# Patient Record
Sex: MALE | Race: WHITE | NOT HISPANIC OR LATINO | Employment: PART TIME | URBAN - METROPOLITAN AREA
[De-identification: names, ages, dates, MRNs, and addresses within clinical notes are randomized per-mention and may not be internally consistent; named-entity substitution may affect disease eponyms.]

---

## 2019-07-11 ENCOUNTER — APPOINTMENT (EMERGENCY)
Dept: RADIOLOGY | Facility: HOSPITAL | Age: 39
End: 2019-07-11
Payer: COMMERCIAL

## 2019-07-11 ENCOUNTER — HOSPITAL ENCOUNTER (EMERGENCY)
Facility: HOSPITAL | Age: 39
Discharge: HOME | End: 2019-07-11
Attending: INTERNAL MEDICINE | Admitting: INTERNAL MEDICINE
Payer: COMMERCIAL

## 2019-07-11 VITALS
TEMPERATURE: 97.8 F | SYSTOLIC BLOOD PRESSURE: 141 MMHG | BODY MASS INDEX: 30.8 KG/M2 | HEART RATE: 81 BPM | HEIGHT: 74 IN | RESPIRATION RATE: 18 BRPM | OXYGEN SATURATION: 96 % | DIASTOLIC BLOOD PRESSURE: 81 MMHG | WEIGHT: 240 LBS
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
## 2019-07-11 DIAGNOSIS — M54.50 LUMBAGO: ICD-10-CM

## 2019-07-11 DIAGNOSIS — M77.9 TENDINITIS: ICD-10-CM

## 2019-07-11 PROCEDURE — 73080 X-RAY EXAM OF ELBOW: CPT

## 2019-07-11 PROCEDURE — 73060 X-RAY EXAM OF HUMERUS: CPT

## 2019-07-11 PROCEDURE — 72100 X-RAY EXAM L-S SPINE 2/3 VWS: CPT

## 2019-07-11 PROCEDURE — 96372 THER/PROPH/DIAG INJ SC/IM: CPT

## 2019-07-11 PROCEDURE — 99284 EMERGENCY DEPT VISIT MOD MDM: CPT

## 2019-07-11 RX ORDER — KETOROLAC TROMETHAMINE 30 MG/ML
30 INJECTION, SOLUTION INTRAMUSCULAR; INTRAVENOUS ONCE
Status: COMPLETED | OUTPATIENT
Start: 2019-07-11 | End: 2019-07-11

## 2019-07-11 RX ORDER — NAPROXEN 500 MG/1
500 TABLET ORAL 2 TIMES DAILY WITH MEALS
Qty: 30 TABLET | Refills: 0 | Status: SHIPPED | OUTPATIENT
Start: 2019-07-11 | End: 2021-01-10

## 2019-07-11 RX ORDER — CYCLOBENZAPRINE HCL 10 MG
10 TABLET ORAL 2 TIMES DAILY PRN
Qty: 20 TABLET | Refills: 0 | Status: SHIPPED | OUTPATIENT
Start: 2019-07-11 | End: 2020-05-08 | Stop reason: ALTCHOICE

## 2019-07-11 RX ORDER — CYCLOBENZAPRINE HCL 10 MG
10 TABLET ORAL ONCE
Status: COMPLETED | OUTPATIENT
Start: 2019-07-11 | End: 2019-07-11

## 2019-07-11 RX ADMIN — CYCLOBENZAPRINE HYDROCHLORIDE 10 MG: 10 TABLET, FILM COATED ORAL at 22:41

## 2019-07-11 RX ADMIN — KETOROLAC TROMETHAMINE 30 MG: 30 INJECTION, SOLUTION INTRAMUSCULAR; INTRAVENOUS at 22:42
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
## 2019-07-12 NOTE — DISCHARGE INSTRUCTIONS
Patient told to follow up with his PMD in 40-72 hours if not improving or to return to the emergency room if worsening

## 2019-07-12 NOTE — ED NOTES
Right elbow abrasion clensed with mamta clens, no bleeding noted       [address] RN  07/11/19 7006
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
## 2020-05-08 ENCOUNTER — OFFICE VISIT (OUTPATIENT)
Dept: URGENT CARE | Facility: CLINIC | Age: 40
End: 2020-05-08
Payer: COMMERCIAL

## 2020-05-08 VITALS
WEIGHT: 225 LBS | HEIGHT: 74 IN | SYSTOLIC BLOOD PRESSURE: 120 MMHG | TEMPERATURE: 97.5 F | OXYGEN SATURATION: 95 % | HEART RATE: 68 BPM | DIASTOLIC BLOOD PRESSURE: 63 MMHG | RESPIRATION RATE: 18 BRPM | BODY MASS INDEX: 28.88 KG/M2

## 2020-05-08 DIAGNOSIS — S39.012A LUMBAR STRAIN, INITIAL ENCOUNTER: Primary | ICD-10-CM

## 2020-05-08 DIAGNOSIS — M62.830 BACK MUSCLE SPASM: ICD-10-CM

## 2020-05-08 PROCEDURE — G0383 LEV 4 HOSP TYPE B ED VISIT: HCPCS | Performed by: FAMILY MEDICINE

## 2020-05-08 PROCEDURE — 96372 THER/PROPH/DIAG INJ SC/IM: CPT | Performed by: FAMILY MEDICINE

## 2020-05-08 PROCEDURE — 99204 OFFICE O/P NEW MOD 45 MIN: CPT | Performed by: FAMILY MEDICINE

## 2020-05-08 PROCEDURE — 99284 EMERGENCY DEPT VISIT MOD MDM: CPT | Performed by: FAMILY MEDICINE

## 2020-05-08 RX ORDER — TIZANIDINE HYDROCHLORIDE 4 MG/1
4 CAPSULE, GELATIN COATED ORAL 3 TIMES DAILY PRN
Qty: 30 CAPSULE | Refills: 0 | Status: SHIPPED | OUTPATIENT
Start: 2020-05-08 | End: 2021-01-10

## 2020-05-08 RX ORDER — MELOXICAM 7.5 MG/1
7.5 TABLET ORAL 2 TIMES DAILY PRN
Qty: 30 TABLET | Refills: 0 | Status: SHIPPED | OUTPATIENT
Start: 2020-05-08 | End: 2021-01-10

## 2020-05-08 RX ORDER — DEXAMETHASONE SODIUM PHOSPHATE 10 MG/ML
10 INJECTION, SOLUTION INTRAMUSCULAR; INTRAVENOUS ONCE
Status: COMPLETED | OUTPATIENT
Start: 2020-05-08 | End: 2020-05-08

## 2020-05-08 RX ADMIN — DEXAMETHASONE SODIUM PHOSPHATE 10 MG: 10 INJECTION, SOLUTION INTRAMUSCULAR; INTRAVENOUS at 16:52

## 2020-06-23 ENCOUNTER — OFFICE VISIT (OUTPATIENT)
Dept: URGENT CARE | Facility: CLINIC | Age: 40
End: 2020-06-23
Payer: COMMERCIAL

## 2020-06-23 ENCOUNTER — APPOINTMENT (OUTPATIENT)
Dept: RADIOLOGY | Facility: CLINIC | Age: 40
End: 2020-06-23
Payer: COMMERCIAL

## 2020-06-23 VITALS
BODY MASS INDEX: 28.23 KG/M2 | HEIGHT: 74 IN | OXYGEN SATURATION: 100 % | TEMPERATURE: 97.3 F | SYSTOLIC BLOOD PRESSURE: 116 MMHG | HEART RATE: 74 BPM | DIASTOLIC BLOOD PRESSURE: 74 MMHG | WEIGHT: 220 LBS | RESPIRATION RATE: 20 BRPM

## 2020-06-23 DIAGNOSIS — S60.511A ABRASION OF RIGHT HAND, INITIAL ENCOUNTER: ICD-10-CM

## 2020-06-23 DIAGNOSIS — S49.92XA INJURY OF LEFT SHOULDER, INITIAL ENCOUNTER: ICD-10-CM

## 2020-06-23 DIAGNOSIS — S40.219A ABRASION, SHOULDER W/O INFECTION: ICD-10-CM

## 2020-06-23 DIAGNOSIS — S43.102A SEPARATION OF LEFT ACROMIOCLAVICULAR JOINT, INITIAL ENCOUNTER: Primary | ICD-10-CM

## 2020-06-23 DIAGNOSIS — S49.92XA INJURY OF LEFT SHOULDER, INITIAL ENCOUNTER: Primary | ICD-10-CM

## 2020-06-23 PROCEDURE — 90715 TDAP VACCINE 7 YRS/> IM: CPT | Performed by: PHYSICIAN ASSISTANT

## 2020-06-23 PROCEDURE — G0382 LEV 3 HOSP TYPE B ED VISIT: HCPCS | Performed by: PHYSICIAN ASSISTANT

## 2020-06-23 PROCEDURE — 99213 OFFICE O/P EST LOW 20 MIN: CPT | Performed by: PHYSICIAN ASSISTANT

## 2020-06-23 PROCEDURE — 99283 EMERGENCY DEPT VISIT LOW MDM: CPT | Performed by: PHYSICIAN ASSISTANT

## 2020-06-23 PROCEDURE — 73030 X-RAY EXAM OF SHOULDER: CPT

## 2020-06-23 PROCEDURE — 90715 TDAP VACCINE 7 YRS/> IM: CPT

## 2020-06-25 VITALS — WEIGHT: 234 LBS | BODY MASS INDEX: 30.04 KG/M2

## 2020-06-25 DIAGNOSIS — S43.102A SEPARATION OF LEFT ACROMIOCLAVICULAR JOINT, INITIAL ENCOUNTER: ICD-10-CM

## 2020-06-25 PROCEDURE — 99203 OFFICE O/P NEW LOW 30 MIN: CPT | Performed by: ORTHOPAEDIC SURGERY

## 2021-01-10 ENCOUNTER — APPOINTMENT (EMERGENCY)
Dept: CT IMAGING | Facility: HOSPITAL | Age: 41
End: 2021-01-10
Payer: COMMERCIAL

## 2021-01-10 ENCOUNTER — HOSPITAL ENCOUNTER (EMERGENCY)
Facility: HOSPITAL | Age: 41
Discharge: HOME | End: 2021-01-10
Attending: EMERGENCY MEDICINE | Admitting: EMERGENCY MEDICINE
Payer: COMMERCIAL

## 2021-01-10 ENCOUNTER — APPOINTMENT (EMERGENCY)
Dept: RADIOLOGY | Facility: HOSPITAL | Age: 41
End: 2021-01-10
Payer: COMMERCIAL

## 2021-01-10 VITALS
SYSTOLIC BLOOD PRESSURE: 122 MMHG | TEMPERATURE: 98.6 F | OXYGEN SATURATION: 95 % | HEART RATE: 98 BPM | HEIGHT: 74 IN | RESPIRATION RATE: 18 BRPM | WEIGHT: 225 LBS | DIASTOLIC BLOOD PRESSURE: 65 MMHG | BODY MASS INDEX: 28.88 KG/M2
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
## 2021-01-10 DIAGNOSIS — S06.0X9A CONCUSSION: ICD-10-CM

## 2021-01-10 DIAGNOSIS — S22.32XA LEFT RIB FRACTURE: ICD-10-CM

## 2021-01-10 PROCEDURE — 99284 EMERGENCY DEPT VISIT MOD MDM: CPT | Performed by: PHYSICIAN ASSISTANT

## 2021-01-10 PROCEDURE — 71101 X-RAY EXAM UNILAT RIBS/CHEST: CPT

## 2021-01-10 PROCEDURE — 99284 EMERGENCY DEPT VISIT MOD MDM: CPT

## 2021-01-10 PROCEDURE — 73610 X-RAY EXAM OF ANKLE: CPT

## 2021-01-10 PROCEDURE — 96372 THER/PROPH/DIAG INJ SC/IM: CPT

## 2021-01-10 PROCEDURE — 70450 CT HEAD/BRAIN W/O DYE: CPT

## 2021-01-10 PROCEDURE — 73090 X-RAY EXAM OF FOREARM: CPT

## 2021-01-10 PROCEDURE — G1004 CDSM NDSC: HCPCS

## 2021-01-10 RX ORDER — KETOROLAC TROMETHAMINE 30 MG/ML
30 INJECTION, SOLUTION INTRAMUSCULAR; INTRAVENOUS ONCE
Status: COMPLETED | OUTPATIENT
Start: 2021-01-10 | End: 2021-01-10

## 2021-01-10 RX ADMIN — KETOROLAC TROMETHAMINE 30 MG: 30 INJECTION, SOLUTION INTRAMUSCULAR at 19:13
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
## 2021-01-12 ENCOUNTER — TELEPHONE (OUTPATIENT)
Dept: INTERNAL MEDICINE CLINIC | Facility: CLINIC | Age: 41
End: 2021-01-12

## 2021-01-12 NOTE — TELEPHONE ENCOUNTER
Xuan significant other called wanting him seen  Stated he needs a muscle relaxer  Stated was in the ED and has rib pain, and is swollen  Did inoform her that we will not provide narcotics  Stated he cannot take them as he is on Suboxen    Do you want to take him as a patient

## 2021-01-12 NOTE — ED ATTENDING ATTESTATION
1/10/2021  Dayami Ramirez MD, have discussed the patient with the resident/non-physician practitioner and agree with the resident's/non-physician practitioner's findings, Plan of Care, and MDM as documented in the resident's/non-physician practitioner's note, except where noted  All available labs and Radiology studies were reviewed  I was present for key portions of any procedure(s) performed by the resident/non-physician practitioner and I was immediately available to provide assistance  At this point I agree with the current assessment done in the Emergency Department

## 2021-11-09 ENCOUNTER — APPOINTMENT (OUTPATIENT)
Dept: LAB | Facility: CLINIC | Age: 41
End: 2021-11-09
Payer: COMMERCIAL

## 2021-11-09 ENCOUNTER — OFFICE VISIT (OUTPATIENT)
Dept: URGENT CARE | Facility: CLINIC | Age: 41
End: 2021-11-09
Payer: COMMERCIAL

## 2021-11-09 VITALS
OXYGEN SATURATION: 98 % | DIASTOLIC BLOOD PRESSURE: 72 MMHG | RESPIRATION RATE: 20 BRPM | TEMPERATURE: 98 F | SYSTOLIC BLOOD PRESSURE: 128 MMHG | WEIGHT: 225 LBS | BODY MASS INDEX: 28.89 KG/M2 | HEART RATE: 109 BPM
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
## 2021-11-09 DIAGNOSIS — S50.861A TICK BITE OF RIGHT FOREARM, INITIAL ENCOUNTER: Primary | ICD-10-CM

## 2021-11-09 PROCEDURE — 99214 OFFICE O/P EST MOD 30 MIN: CPT | Performed by: NURSE PRACTITIONER

## 2021-11-09 RX ORDER — DOXYCYCLINE 100 MG/1
100 CAPSULE ORAL 2 TIMES DAILY
Qty: 42 CAPSULE | Refills: 0 | Status: SHIPPED | OUTPATIENT
Start: 2021-11-09 | End: 2021-11-30

## 2023-01-06 ENCOUNTER — HOSPITAL ENCOUNTER (EMERGENCY)
Facility: HOSPITAL | Age: 43
Discharge: HOME | End: 2023-01-06
Attending: EMERGENCY MEDICINE

## 2023-01-06 VITALS
OXYGEN SATURATION: 98 % | DIASTOLIC BLOOD PRESSURE: 81 MMHG | TEMPERATURE: 97.9 F | SYSTOLIC BLOOD PRESSURE: 138 MMHG | HEART RATE: 73 BPM | RESPIRATION RATE: 20 BRPM

## 2023-01-06 DIAGNOSIS — F19.10 DRUG ABUSE (HCC): ICD-10-CM

## 2023-01-06 DIAGNOSIS — R45.851 SUICIDAL IDEATIONS: ICD-10-CM

## 2023-01-06 DIAGNOSIS — Z00.8 ENCOUNTER FOR PSYCHOLOGICAL EVALUATION: Primary | ICD-10-CM

## 2023-01-06 DIAGNOSIS — F15.10 METHAMPHETAMINE ABUSE (HCC): ICD-10-CM

## 2023-01-06 LAB
AMPHETAMINES SERPL QL SCN: POSITIVE
BARBITURATES UR QL: NEGATIVE
BENZODIAZ UR QL: NEGATIVE
COCAINE UR QL: NEGATIVE
ETHANOL EXG-MCNC: NORMAL MG/DL
FLUAV RNA RESP QL NAA+PROBE: NEGATIVE
FLUBV RNA RESP QL NAA+PROBE: NEGATIVE
METHADONE UR QL: NEGATIVE
OPIATES UR QL SCN: NEGATIVE
OXYCODONE+OXYMORPHONE UR QL SCN: NEGATIVE
PCP UR QL: NEGATIVE
RSV RNA RESP QL NAA+PROBE: NEGATIVE
SARS-COV-2 RNA RESP QL NAA+PROBE: NEGATIVE
THC UR QL: POSITIVE
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
## 2023-01-06 NOTE — ED NOTES
Summoned to patient room by 1:1 staff reporting patient upset stating he needs to go to work  Reviewed patient records and found patient to have 302 upheld by night shift physician  Explained [phone] patient legally for up to 120 hours until such time a psychiatrist can determine patient is safe to leave  Patient upset with hearing this stating, "They told me I was fine last night " Explained that any attempt to leave the premises would result in police notification and apprehension with return to hospital   Patient also informed that this may also result in the use of restraints to protect others from aggression and to protect himself from self harm  While patient expressed his displeasure, it is my belief he understands the situation appropriately       Letty Santiago, YUAN  01/06/23 8913

## 2023-01-06 NOTE — ED NOTES
This writer discussed the patients current presentation and recommended discharge plan with Gale Garcia  The patient was Instructed to follow up with their PCP  The patient was provided with referral information for:   Outpatient resource/patient denied  The patient declined to complete a safety plan however a blank plan was provided for future use  In addition, the patient was instructed to call local [facility] crisis, other crisis services, [facility] or to go to the nearest ER immediately if their situation changes at any time  This writer discussed discharge plans with the patient who agrees with and understands the discharge plans           SAFETY PLAN  Warning Signs (thoughts, images, mood, behavior, situations) of a potential crisis: suicidal ideations, homicidal ideations      Coping Skills (what can I do to take my mind off the problem, or to keep myself safe): talk to on call Crisis, walk out of the situation, take a deep breath      Outside Support (who can I reach out to for support and help): friends        [facility] Suicide Prevention Hotline:  [address] 310: [facility]: [place] [address] [facility] [address] [place] [address] Ave [phone] - Crisis   [phone] - Peer Support Talk Line (1-9pm daily)  [phone] - Teen Support Talk Line (1-9pm daily)  [address] ([place]) [phone] - 2696 [facility]

## 2023-01-06 NOTE — ED NOTES
Patient was told by this writer that crisis would evaluate the patient in the morning when they are available  Patient stated to this writer that "This is bullshit  I am going to lose my job"  RN offered medication to help patient relax  Patient requested to have cell phone  RN gave the option of having a handheld phone  Patient refusing at this time and not speaking to RN        Constanza Forrester RN  01/06/23 Missy Bunn 14 Krish Allison RN  01/06/23 6525

## 2023-01-06 NOTE — ED NOTES
[place] Suicide Risk Assessment deferred, as unable to assess while patient sleeping  Behavioral Health Assessment deferred as patient is sleeping and would benefit from additional rest   Vital signs deferred until patient awake, no signs or symptoms of respiratory distress at this time  Once patient is awake and able to participate, will complete assessments        Yojana Sampson RN  01/06/23 9844

## 2023-01-06 NOTE — DISCHARGE INSTRUCTIONS
This writer discussed the patients current presentation and recommended discharge plan with Glendy Lee  The patient was Instructed to follow up with their PCP  The patient was provided with referral information for:   Outpatient resource/patient denied  The patient declined to complete a safety plan however a blank plan was provided for future use  In addition, the patient was instructed to call local [facility] crisis, other crisis services, [facility] or to go to the nearest ER immediately if their situation changes at any time  This writer discussed discharge plans with the patient who agrees with and understands the discharge plans           SAFETY PLAN  Warning Signs (thoughts, images, mood, behavior, situations) of a potential crisis: suicidal ideations, homicidal ideations      Coping Skills (what can I do to take my mind off the problem, or to keep myself safe): talk to on call Crisis, walk out of the situation, take a deep breath      Outside Support (who can I reach out to for support and help): friends        [place] Suicide Prevention Hotline:  [address] 310: [facility]: [address] [facility] [phone] - Crisis   [phone] - Peer Support Talk Line (1-9pm daily)  [phone] - Teen Support Talk Line (1-9pm daily)  [address] 1111 [facility]) [phone] - 2696 [facility]

## 2023-01-06 NOTE — ED NOTES
Per psychiatrist Dr Sapna Davenport:  Recommendations:   --Patient is psychiatrically cleared for discharge home  He may follow-up in the outpatient setting with his PCP  Patient denies safety plan, denies outpatient resources

## 2023-01-06 NOTE — ED PROVIDER NOTES
History  Chief Complaint   Patient presents with   • Psychiatric Evaluation     Patient was brought in by [facility] police on a [address]  Patient reportedly got into a verbal altercation with his significant other  Patient reportedly made threats to harm himself and had access to a gun in the home  Patient denies any SI/HI today  74-year-old male with history of polysubstance abuse presents to the emergency department with police for psychiatric evaluation with 302 petitioned by significant other  Per 302 documentation, the patient's girlfriend noted that 2 weeks ago her mother was at the house and told him and that he wanted him to leave and the patient responded with "I will burn the house down with all of us and it and I will splatter my brains on the wall "  The patient reportedly told her tonight that he was tired of this shit and then she heard a gun cock and realized that he had taken her gun  Per police who are at bedside, they report that the patient and his girlfriend got into a verbal altercation and that she was concerned that he was going to shoot himself  Per them, she alleges that he had made statements of wanting to kill himself and others last week  The  showed me a picture from the house where he had written on a piece of furniture stating goodbye and that he had loved her  The gun was found and secured by police it was unloaded  Patient admits to getting into an altercation with girlfriend tonight, but is otherwise uncooperative with questioning  He complains of some chronic back pain that is not worsening, but denies any other symptoms  He denies any SI or HI  He is not actively hallucinating  He admits to marijuana use, denies any other drug use  He is not currently on any medications  Prior to Admission Medications   Prescriptions Last Dose Informant Patient Reported? Taking?    BUPRENORPHINE HCL-NALOXONE HCL SL   Yes No   Sig: Place 1 tablet under the tongue daily   Patient not taking: Reported on 11/9/2021       Facility-Administered Medications: None       History reviewed  No pertinent past medical history  Past Surgical History:   Procedure Laterality Date   • JOINT REPLACEMENT         History reviewed  No pertinent family history  I have reviewed and agree with the history as documented  E-Cigarette/Vaping     E-Cigarette/Vaping Substances     Social History     Tobacco Use   • Smoking status: Former     Packs/day: 0 50     Types: Cigarettes   • Smokeless tobacco: Current     Types: Chew   Substance Use Topics   • Alcohol use: Not Currently     Comment: rare    • Drug use: Never       Review of Systems   Constitutional: Negative for chills and fever  HENT: Negative for ear pain and sore throat  Eyes: Negative for pain and visual disturbance  Respiratory: Negative for cough and shortness of breath  Cardiovascular: Negative for chest pain and palpitations  Gastrointestinal: Negative for abdominal pain and vomiting  Genitourinary: Negative for dysuria and hematuria  Musculoskeletal: Positive for back pain  Negative for arthralgias  Skin: Negative for color change and rash  Neurological: Negative for seizures and syncope  Psychiatric/Behavioral: Positive for suicidal ideas  The patient is nervous/anxious  All other systems reviewed and are negative  Physical Exam  Physical Exam  Vitals and nursing note reviewed  Constitutional:       General: He is not in acute distress  HENT:      Head: Normocephalic and atraumatic  Right Ear: External ear normal       Left Ear: External ear normal       Nose: Nose normal       Mouth/Throat:      Mouth: Mucous membranes are moist    Eyes:      Extraocular Movements: Extraocular movements intact  Conjunctiva/sclera: Conjunctivae normal       Pupils: Pupils are equal, round, and reactive to light  Cardiovascular:      Rate and Rhythm: Normal rate and regular rhythm        Pulses: Normal pulses  Pulmonary:      Effort: Pulmonary effort is normal  No respiratory distress  Breath sounds: No stridor  Musculoskeletal:         General: No deformity  Normal range of motion  Cervical back: Normal range of motion and neck supple  Skin:     General: Skin is warm and dry  Capillary Refill: Capillary refill takes less than 2 seconds  Neurological:      General: No focal deficit present  Mental Status: He is alert and oriented to person, place, and time  Psychiatric:         Attention and Perception: Attention normal          Mood and Affect: Affect is flat  Thought Content: Thought content does not include homicidal or suicidal ideation  Thought content does not include homicidal or suicidal plan  Vital Signs  ED Triage Vitals [01/06/23 0207]   Temperature Pulse Respirations Blood Pressure SpO2   97 6 °F (36 4 °C) 85 18 136/95 96 %      Temp Source Heart Rate Source Patient Position - Orthostatic VS BP Location FiO2 (%)   Tympanic Monitor -- -- --      Pain Score       No Pain           Vitals:    01/06/23 0207   BP: 136/95   Pulse: 85         Visual Acuity      ED Medications  Medications - No data to display    Diagnostic Studies  Results Reviewed     Procedure Component Value Units Date/Time    Rapid drug screen, urine [[id number]]  (Abnormal) Collected: 01/06/23 0303    Lab Status: Final result Specimen: Urine, Other Updated: 01/06/23 0326     Amph/Meth UR Positive     Barbiturate Ur Negative     Benzodiazepine Urine Negative     Cocaine Urine Negative     Methadone Urine Negative     Opiate Urine Negative     PCP Ur Negative     THC Urine Positive     Oxycodone Urine Negative    Narrative:      Presumptive report  If requested, specimen will be sent to reference lab for confirmation  FOR MEDICAL PURPOSES ONLY  IF CONFIRMATION NEEDED PLEASE CONTACT THE LAB WITHIN 5 DAYS      Drug Screen Cutoff Levels:  AMPHETAMINE/METHAMPHETAMINES  1000 ng/mL  BARBITURATES     200 ng/mL  BENZODIAZEPINES     200 ng/mL  COCAINE      300 ng/mL  METHADONE      300 ng/mL  OPIATES      300 ng/mL  PHENCYCLIDINE     25 ng/mL  THC       50 ng/mL  OXYCODONE      100 ng/mL    FLU/RSV/COVID - if FLU/RSV clinically relevant [[id number]]  (Normal) Collected: 01/06/23 0210    Lab Status: Final result Specimen: Nares from Nose Updated: 01/06/23 0258     SARS-CoV-2 Negative     INFLUENZA A PCR Negative     INFLUENZA B PCR Negative     RSV PCR Negative    Narrative:      FOR PEDIATRIC PATIENTS - copy/paste COVID Guidelines URL to browser: [email or web address]  [employer]    SARS-CoV-2 assay is a Nucleic Acid Amplification assay intended for the  qualitative detection of nucleic acid from SARS-CoV-2 in nasopharyngeal  swabs  Results are for the presumptive identification of SARS-CoV-2 RNA  Positive results are indicative of infection with SARS-CoV-2, the virus  causing COVID-19, but do not rule out bacterial infection or co-infection  with other viruses  Laboratories within the [place] and its  territories are required to report all positive results to the appropriate  public health authorities  Negative results do not preclude SARS-CoV-2  infection and should not be used as the sole basis for treatment or other  patient management decisions  Negative results must be combined with  clinical observations, patient history, and epidemiological information  This test has not been FDA cleared or approved  This test has been authorized by FDA under an Emergency Use Authorization  (EUA)  This test is only authorized for the duration of time the  declaration that circumstances exist justifying the authorization of the  emergency use of an in vitro diagnostic tests for detection of SARS-CoV-2  virus and/or diagnosis of COVID-19 infection under section 564(b)(1) of  the Act, 21 U  S C  212ZUS-2(C)(0), unless the authorization is terminated  or revoked sooner  The test has been validated but independent review by FDA  and CLIA is pending  Test performed using [employer] Gene[employer]pert: This RT-PCR assay targets N2,  a region unique to SARS-CoV-2  A conserved region in the E-gene was chosen  for pan-Sarbecovirus detection which includes SARS-CoV-2  According to CMS-2020-01-R, this platform meets the definition of high-throughput technology  POCT alcohol breath test [[id number]]  (Normal) Resulted: 01/06/23 0209    Lab Status: Final result Updated: 01/06/23 0209     EXTBreath Alcohol 0 0%                 No orders to display              Procedures  Procedures         ED Course  ED Course as of 01/06/23 0427   Fri Jan 06, 2023   0232 Medically cleared for inpatient psych                               SBIRT 22yo+    Flowsheet Row Most Recent Value   SBIRT (25 yo +)    In order to provide better care to our patients, we are screening all of our patients for alcohol and drug use  Would it be okay to ask you these screening questions? Yes Filed at: 01/06/2023 1071   Initial Alcohol Screen: US AUDIT-C     1  How often do you have a drink containing alcohol? 0 Filed at: 01/06/2023 0215   2  How many drinks containing alcohol do you have on a typical day you are drinking? 0 Filed at: 01/06/2023 0215   3a  Male UNDER 65: How often do you have five or more drinks on one occasion? 0 Filed at: 01/06/2023 0215   3b  FEMALE Any Age, or MALE 65+: How often do you have 4 or more drinks on one occassion? 0 Filed at: 01/06/2023 0215   Audit-C Score 0 Filed at: 01/06/2023 7574   JOON: How many times in the past year have you    Used an illegal drug or used a prescription medication for non-medical reasons? Never Filed at: 01/06/2023 0215                    Medical Decision Making  14-year-old male presents with police for psychiatric evaluation with 302 petitioned  Patient currently denying any SI or HI    Will check that, UDS, and viral swab per protocol and consult crisis  Although patient is currently denying any SI or HI, based on police report and 445 patient appears to be a potential risk to himself and others  [address] will evaluate in the morning  Drug abuse [facility]): acute illness or injury  Encounter for psychological evaluation: acute illness or injury  Suicidal ideations: acute illness or injury  Amount and/or Complexity of Data Reviewed  Independent Historian:      Details: Police   Labs: ordered  Risk  Decision regarding hospitalization  Disposition  Final diagnoses:   Encounter for psychological evaluation   Drug abuse ([facility] [[email or web address] )   Suicidal ideations     Time reflects when diagnosis was documented in both MDM as applicable and the Disposition within this note     Time User Action Codes Description Comment    1/6/2023  2:58 AM Check, Francetta January Add [Z00 8] Encounter for psychological evaluation     1/6/2023  2:58 AM Check, Francetta January Add [F19 10] Drug abuse ([facility] [address] )     1/6/2023  2:58 AM Check, Francetta January Add [R45 851] Suicidal ideations       ED Disposition     ED Disposition   Transfer to [address] [place] [address]   --    Date/Time   Fri Jan 6, 2023  2:58 AM    Comment   Sharif Wbeer should be transferred out to psych and has been medically cleared  Follow-up Information    None         Patient's Medications   Discharge Prescriptions    No medications on file       No discharge procedures on file      PDMP Review     None          ED Provider  Electronically Signed by           Cedrick Hendrickson MD  01/06/23 5763

## 2023-01-06 NOTE — ED NOTES
Preeti Vasquez is a 37 y/o male with no known mental health diagnoses that presented at ED on 302 petitioned by the patient’s girlfriend  302 states: “My boyfriend has a history of methenamine use on and off for the past several years  I am a   Over the past month I have told him that he has to leave the home due to his erratic behavior  Two weeks ago my mother who owns the house wanted him to leave  He said will burn the house down with all of us in it and I will mayen my brains on the wall  Tonight, I was in my bedroom  He came in and said I am tired of this shit get up and I heard a gun cloak  It was my bun  I got up and we went outside to the garage  I got away from him and locked him out of the house  The police came and handcuffed him  They are taking him to the station awaiting the 302 petition”  Patient arrived to ED via police car  Patient semi cooperative with the assessment  Patient presents with a semi-good eye contact  Patient oriented x 4  Patient lives with his girlfriend and her mother  Patient currently employed  Patient not on any medication  No inpatient psychiatric hospitalization reported  Patient denies ALL accusations stated on the 302  Patient states [employer]i Plaza is lying”  Patient denies making any verbal suicidal, homicidal threats  Patient denies holding a gun stating the gun was in the drawer at all times, unloaded  Patient states he got into verbal argument with his girlfriend over the “personal stuff”  Patient refused to provide details  Patient denies any current stressors, triggers  Patient denies any current issues  Patient states he owns the house he lives in  Patient states “you cannot keep me here I need to go to work”  No other information was provided at this time  Patient was informed about the 302  Patient upset stating the attending doctor told the patient that he was fine  Patient was informed about his rights   Patient appears to be understanding his rights but refusing to go to inpatient psychiatric hospital      Patient denies suicidal thoughts, intentions or plan  Patient denies homicidal thoughts, intentions or plan  Patient denies self harming  Patient denies hallucinations  No paranoia, delusions observed  Patient denies appetite problems  Patient denies sleep problems  Patient reports “occasional” drug use, Meth and THC  Last use 2 days ago

## 2024-08-27 ENCOUNTER — TELEPHONE (OUTPATIENT)
Age: 44
End: 2024-08-27

## 2024-08-27 NOTE — TELEPHONE ENCOUNTER
Ariana from [employer], called in inquiring if fax sent: 08/21 for request of pt medical records was received?    Reviewed chart and did not see any fax received in Media section.     Informed Ariana to refax again & confirmed fax number.